# Patient Record
Sex: MALE
[De-identification: names, ages, dates, MRNs, and addresses within clinical notes are randomized per-mention and may not be internally consistent; named-entity substitution may affect disease eponyms.]

---

## 2018-07-09 ENCOUNTER — HOSPITAL ENCOUNTER (OUTPATIENT)
Dept: HOSPITAL 89 - OR | Age: 63
Setting detail: OBSERVATION
LOS: 1 days | Discharge: HOME | End: 2018-07-10
Attending: ORTHOPAEDIC SURGERY | Admitting: ORTHOPAEDIC SURGERY
Payer: COMMERCIAL

## 2018-07-09 VITALS
WEIGHT: 240 LBS | BODY MASS INDEX: 30.8 KG/M2 | HEIGHT: 74 IN | HEIGHT: 74 IN | WEIGHT: 240 LBS | BODY MASS INDEX: 30.8 KG/M2

## 2018-07-09 VITALS — SYSTOLIC BLOOD PRESSURE: 163 MMHG | DIASTOLIC BLOOD PRESSURE: 94 MMHG

## 2018-07-09 VITALS — SYSTOLIC BLOOD PRESSURE: 145 MMHG | DIASTOLIC BLOOD PRESSURE: 90 MMHG

## 2018-07-09 VITALS — SYSTOLIC BLOOD PRESSURE: 154 MMHG | DIASTOLIC BLOOD PRESSURE: 89 MMHG

## 2018-07-09 VITALS — DIASTOLIC BLOOD PRESSURE: 99 MMHG | SYSTOLIC BLOOD PRESSURE: 150 MMHG

## 2018-07-09 VITALS — SYSTOLIC BLOOD PRESSURE: 159 MMHG | DIASTOLIC BLOOD PRESSURE: 94 MMHG

## 2018-07-09 VITALS — SYSTOLIC BLOOD PRESSURE: 124 MMHG | DIASTOLIC BLOOD PRESSURE: 78 MMHG

## 2018-07-09 VITALS — DIASTOLIC BLOOD PRESSURE: 92 MMHG | SYSTOLIC BLOOD PRESSURE: 150 MMHG

## 2018-07-09 VITALS — DIASTOLIC BLOOD PRESSURE: 91 MMHG | SYSTOLIC BLOOD PRESSURE: 158 MMHG

## 2018-07-09 VITALS — SYSTOLIC BLOOD PRESSURE: 145 MMHG | DIASTOLIC BLOOD PRESSURE: 91 MMHG

## 2018-07-09 VITALS — DIASTOLIC BLOOD PRESSURE: 93 MMHG | SYSTOLIC BLOOD PRESSURE: 156 MMHG

## 2018-07-09 VITALS — DIASTOLIC BLOOD PRESSURE: 95 MMHG | SYSTOLIC BLOOD PRESSURE: 161 MMHG

## 2018-07-09 VITALS — DIASTOLIC BLOOD PRESSURE: 99 MMHG | SYSTOLIC BLOOD PRESSURE: 162 MMHG

## 2018-07-09 VITALS — DIASTOLIC BLOOD PRESSURE: 99 MMHG | SYSTOLIC BLOOD PRESSURE: 161 MMHG

## 2018-07-09 DIAGNOSIS — M48.062: Primary | ICD-10-CM

## 2018-07-09 PROCEDURE — 97161 PT EVAL LOW COMPLEX 20 MIN: CPT

## 2018-07-09 PROCEDURE — 63030 LAMOT DCMPRN NRV RT 1 LMBR: CPT

## 2018-07-09 PROCEDURE — 72020 X-RAY EXAM OF SPINE 1 VIEW: CPT

## 2018-07-09 RX ADMIN — DOCUSATE SODIUM SCH MG: 100 CAPSULE, LIQUID FILLED ORAL at 21:10

## 2018-07-09 RX ADMIN — PANTOPRAZOLE SODIUM SCH MG: 40 TABLET, DELAYED RELEASE ORAL at 21:10

## 2018-07-09 RX ADMIN — HYDROCODONE BITARTRATE AND ACETAMINOPHEN PRN EACH: 5; 325 TABLET ORAL at 21:10

## 2018-07-09 RX ADMIN — GABAPENTIN SCH MG: 300 CAPSULE ORAL at 21:10

## 2018-07-09 RX ADMIN — CEFAZOLIN SODIUM SCH MLS/HR: 2 SOLUTION INTRAVENOUS at 21:21

## 2018-07-09 NOTE — RADIOLOGY IMAGING REPORT
FACILITY: [facility] 

 

PATIENT NAME: Fady Field

: 1955

MR: [id number]

V: [id number]

EXAM DATE: 

ORDERING PHYSICIAN: LUI WU

TECHNOLOGIST: 

 

Location: [facility]

Patient: Fady Field

: 1955

MRN: [id number]

Visit/Account:[id number]

Date of Sevice:  2018

 

ACCESSION #: [id number]

 

Technique: LUMBAR SPINE 1 VIEW

 

HISTORY: Laminectomy

 

Comparison studies:  MRI lumbar spine May 30, 2018 report

 

FINDINGS: Surgical instrumentation and wires are seen posterior to the L2-L3 vertebral bodies.  Multi
level degenerative changes are again noted characterized by endplate osteophytosis.  There is 4 mm an
terolisthesis of L4 on L5.

 

IMPRESSION:

1.  Postoperative and degenerative findings as described above.

 

Report Dictated By: Masoud Cope DO at 2018 1:29 PM

 

Report E-Signed By: Masoud Cope DO  at 2018 1:31 PM

 

WSN:[id number]

## 2018-07-09 NOTE — HOSPITALIST CONSULTATION
History of Present Illness


Requesting Physician


Dr. Ordoñez


Reason for Consult


Hypertension


History of Present Illness


This patient was admitted for lumbar spine surgery.  It is reported that the 

surgery went well and was without complication.





History


Problems:  


(1) Essential hypertension


(2) BPH (benign prostatic hyperplasia)


Home Meds


Reported Medications


Tramadol Hcl (TRAMADOL HCL) 50 Mg Tablet,  MG PO PRN, TAB


   7/3/18


Vitamin E Acetate (VITAMIN E) 400 Unit Capsule, 400 UNIT PO DAILY, CAPSULE


   7/3/18


Magnesium Oxide (MAGNESIUM) 400 Mg Capsule, 400 MG PO DAILY, CAPSULE


   7/3/18


[place]-3/Dha/Epa/Fish Oil (FISH OIL 1,400 MG SOFTGEL) 1 Each Capsule.dr, 1 EACH 

PO DAILY


   7/3/18


Multivitamin (MULTI-VITAMIN DAILY) 1 Each Tablet, 1 EACH PO DAILY


   7/3/18


Chondroitin Sulfate A (CHONDROITIN SULFATE) 250 Mg Capsule, 1200 MG PO DAILY, 

CAPSULE


   7/3/18


Glucosamine Sulfate 2KCL (GLUCOSAMINE) 1,000 Mg Tablet, 1500 MG PO DAILY


   7/3/18


Omeprazole (OMEPRAZOLE) 40 Mg Capsule.dr, 40 MG PO BID, CAP


   7/3/18


Amlodipine Bes/Olmesartan Med (VITOR 10-40 MG TABLET) 1 Each Tablet, 1 EACH PO


   7/3/18


Dutasteride (Dutasteride) 0.5 Mg Capsule, 1 CAP PO DAILY


   7/3/18


Cetirizine Hcl (CETIRIZINE HCL) 10 Mg Tablet, 10 MG PO QDAY, TAB


   7/3/18


Colchicine (COLCRYS) 0.6 Mg Tablet, 0.6 MG PO PRN


   7/3/18


Gabapentin (GABAPENTIN) 300 Mg Capsule, 300 MG PO TID, CAPSULE


   7/3/18


Allergies:  


Coded Allergies:  


     No Known Drug Allergies (Unverified , 7/3/18)


Hx Smoking:  No (QUIT 1976)


Caffeine Intake:  Coffee, Tea


Caffeine/Cups Per Day:  2-3 CUPS A DAY MOSTLY DECAF


Hx Alcohol Use:  No


Hx Substance Use Disorder:  No





Review of Systems


All Systems Reviewed/Normal:  Yes





Exam


Vital Signs





Vital Signs








  Date Time  Temp Pulse Resp B/P (MAP) Pulse Ox O2 Delivery O2 Flow Rate FiO2


 


7/9/18 19:07 98.2 90 20 145/91 (109) 94 Nasal Cannula 2.0 








Cardiovascular:  Regular Rate and Rhythm


Respiratory:  Clear to Auscultation


Extremities:  No Edema


Integumentary:  No Cyanosis





Assessment and Plan


Problems:  


(1) Essential hypertension


Assessment & Plan:  He is on chronic treatment with amlodipine/olmesartan.  

Both medications have been ordered with hold parameters.





(2) BPH (benign prostatic hyperplasia)


Assessment & Plan:  He is on chronic treatment with dutasteride.





Copies to:   LUI ORDOÑEZ MD





Venous Thromboembolism


Antithrombotics


Is Pt On Any Antithrombotics?:  No





Exam


Sepsis Risk:  No Definite Risk











JESSICA TESFAYE DO Jul 9, 2018 19:59

## 2018-07-10 VITALS — DIASTOLIC BLOOD PRESSURE: 76 MMHG | SYSTOLIC BLOOD PRESSURE: 162 MMHG

## 2018-07-10 VITALS — SYSTOLIC BLOOD PRESSURE: 136 MMHG | DIASTOLIC BLOOD PRESSURE: 82 MMHG

## 2018-07-10 VITALS — SYSTOLIC BLOOD PRESSURE: 167 MMHG | DIASTOLIC BLOOD PRESSURE: 92 MMHG

## 2018-07-10 RX ADMIN — PANTOPRAZOLE SODIUM SCH MG: 40 TABLET, DELAYED RELEASE ORAL at 08:54

## 2018-07-10 RX ADMIN — HYDROCODONE BITARTRATE AND ACETAMINOPHEN PRN EACH: 5; 325 TABLET ORAL at 08:59

## 2018-07-10 RX ADMIN — CEFAZOLIN SODIUM SCH MLS/HR: 2 SOLUTION INTRAVENOUS at 04:23

## 2018-07-10 RX ADMIN — DOCUSATE SODIUM SCH MG: 100 CAPSULE, LIQUID FILLED ORAL at 08:55

## 2018-07-10 RX ADMIN — HYDROCODONE BITARTRATE AND ACETAMINOPHEN PRN EACH: 5; 325 TABLET ORAL at 17:51

## 2018-07-10 RX ADMIN — GABAPENTIN SCH MG: 300 CAPSULE ORAL at 14:40

## 2018-07-10 RX ADMIN — GABAPENTIN SCH MG: 300 CAPSULE ORAL at 08:56

## 2018-07-10 RX ADMIN — CEFAZOLIN SODIUM SCH MLS/HR: 2 SOLUTION INTRAVENOUS at 13:26

## 2018-07-10 NOTE — HOSPITALIST PROGRESS NOTE
Subjective


Progress Notes


Subjective


He has no complaints this morning. He had no acute events overnight.





Patient Complains of:


Cardiovascular:  No: Chest Pain


Respiratory:  No: Shortness of Breath





Physical Exam





Vital Signs








  Date Time  Temp Pulse Resp B/P (MAP) Pulse Ox O2 Delivery O2 Flow Rate FiO2


 


7/10/18 07:50     93   


 


7/10/18 07:41      Room Air  


 


7/10/18 04:20 97.9 70 20     


 


7/9/18 22:00    124/78 (93)   2.0 








General Appearance:  Alert, Awake, No Acute Distress, Afebrile


Neuro:  No Gross deficits


Cardiovascular:  Regular Rate and Rhythm


Respiratory:  No Respiratory Distress, Clear to Auscultation


GI:  Soft and Non-Tender


Psych:  Alert & Oriented X3, Appropriate Mood & Affect





Assessment and Plan


Problems:  


(1) Essential hypertension


Assessment & Plan:  He is on chronic treatment with amlodipine/olmesartan.  

Both medications have been ordered with hold parameters.





(2) BPH (benign prostatic hyperplasia)


Assessment & Plan:  He is on chronic treatment with dutasteride.








Exam


Sepsis Risk:  No Definite Risk











IGNACIA FOX FNP Jul 10, 2018 08:13

## 2018-07-16 NOTE — OPERATIVE REPORT 1
EVENT DATE: July 9, 2018

SURGEON: Gama Ordoñez MD

ANESTHESIOLOGIST: Shane Salvador MD

ANESTHESIA: General endotracheal.   

ASSISTANT: PAULETTE Pfeiffer 



PREOPERATIVE DIAGNOSIS  

L3-L4 spinal stenosis with radiculopathy and neurogenic claudication.  



POSTOPERATIVE DIAGNOSIS 

L3-L4 spinal stenosis with radiculopathy and neurogenic claudication.  



PROCEDURE PERFORMED 

L3-L4 laminectomy. 



IV FLUIDS 

1900 mL. 



ESTIMATED BLOOD LOSS 

60 mL.



IMPLANTS

None.



SPECIMENS

None.



DRAINS

None.



COMPLICATIONS

None.



DISPOSITION

Post anesthesia care unit.  



INDICATION FOR SURGERY

Mr. Field is a 63-year-old male who presented with a complaint of radiating 
pain, numbness and tingling, left greater than right down bilateral lower 
extremities in the posterolateral hip, posterior thigh and lateral calf.  He 
had failed physical therapy, medications and activity modifications.  His 
physical examination revealed 5/5 strength in all lower extremity muscle groups 
and light touch sensation intact in all dermatomes.  His imaging studies showed 
a fusion of a spondylolisthesis at L4-L5, which was in situ and uninstrumented, 
and at the level above, he had significant facet hypertrophy, broad-based disk 
bulging and thickened ligamentum flavum resulting in significant spinal 
stenosis.  Secondary to ongoing pain, Mr. Field was offered and elected to 
undergo L3-L4 laminectomy.  



Prior to surgery, I explained in detail to the patient the possible risks of 
surgery.  This included bleeding, infection, damage to nerve roots, damage to 
surrounding structures, persistent and/or worsening pain, spinal fluid leak, 
meningitis, death, blindness, sexual dysfunction, autonomic nervous system 
dysfunction, and other unforeseen medical and surgical complications.  An 
understanding that spinal surgery is more predictive at improving extremity 
discomfort than axial spine pain was stressed.  



DESCRIPTION OF PROCEDURE

On the day of surgery, the patient was met in the preoperative hold area, and 
all questions were answered.  The operative site was identified and marked by 
myself.  The patient was brought in good condition to the operating room, and 
after succumbing to anesthesia, was positioned in the prone position on a 
Tip table.  All bony protuberances and soft tissues were well padded in the 
standard fashion.  Care was taken to maintain appropriate perfusion pressures 
during anesthesia.  IV antibiotics were administered according to the 
appropriate timing schedule.  At the conclusion of the procedure, sponge and 
needle count were correct x two.



Final time out was undertaken by members of the operating team to confirm 
correct patient, correct levels and correct surgery.  He was then prepped and 
draped in standard sterile orthopedic fashion.  An incision was made over the 
intended surgical levels.  Sharp dissection was carried out down to the 
posterior elements, and a lateral radiograph was obtained to confirm correct 
levels.  At this point, the L3 spinous process was removed using a [place] 
rongeur, and the lamina was then thinned down the midline with a combination of 
the [place] rongeur and a high-speed anastasiya.  The canal was entered by 
undermining the superior insertion of the ligamentum flavum from the inferior 
surface of the L3 lamina.  The [place] elevator was used to separate dural 
adhesions from surrounding bone and soft tissue prior to use of a [place] 
punch.  A #4 [place] rongeur was used to perform a midline decompression.  
Bilateral lateral recess decompressions were then performed with a combination 
of the 3-0 and 4-0 Kerrison.  At the conclusion of the decompression, a [place] 
elevator was passed along the lateral recesses as well as out the foramina to 
ensure adequate decompression was achieved.  Meticulous hemostasis was then 
obtained, and the wound was closed in layers using interrupted sutures for the 
deep fascia, inverted interrupted sutures for the subcutaneous tissue, and then 
a running subcuticular skin stitch.  Sponge and needle counts were correct x 
two.  



POSTOPERATIVE CARE PLAN

Mr. Field will remain in the hospital at least overnight, and then will be 
discharged home once he meets discharge criteria.  He will follow up in my 
clinic in two weeks time for repeat examination and wound check. 
LOVELY